# Patient Record
Sex: MALE | Race: BLACK OR AFRICAN AMERICAN | NOT HISPANIC OR LATINO | Employment: FULL TIME | URBAN - METROPOLITAN AREA
[De-identification: names, ages, dates, MRNs, and addresses within clinical notes are randomized per-mention and may not be internally consistent; named-entity substitution may affect disease eponyms.]

---

## 2018-08-30 ENCOUNTER — OFFICE VISIT (OUTPATIENT)
Dept: UROLOGY | Facility: CLINIC | Age: 33
End: 2018-08-30
Payer: COMMERCIAL

## 2018-08-30 VITALS
HEART RATE: 82 BPM | BODY MASS INDEX: 31.66 KG/M2 | WEIGHT: 260 LBS | SYSTOLIC BLOOD PRESSURE: 134 MMHG | HEIGHT: 76 IN | DIASTOLIC BLOOD PRESSURE: 86 MMHG

## 2018-08-30 DIAGNOSIS — E29.1 TESTICULAR FAILURE: Primary | ICD-10-CM

## 2018-08-30 PROCEDURE — 3008F BODY MASS INDEX DOCD: CPT | Mod: CPTII,S$GLB,, | Performed by: UROLOGY

## 2018-08-30 PROCEDURE — 99999 PR PBB SHADOW E&M-NEW PATIENT-LVL III: CPT | Mod: PBBFAC,,, | Performed by: UROLOGY

## 2018-08-30 PROCEDURE — 99204 OFFICE O/P NEW MOD 45 MIN: CPT | Mod: S$GLB,,, | Performed by: UROLOGY

## 2018-08-30 NOTE — PROGRESS NOTES
"Chief Complaint:  Infertility    HPI:    Mr. Livingston is a 32 y.o.  male who has been  to his wife for the past 4 years. They have been trying to achieve a pregnancy for the past 3 years but without success. 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 He denies a history of erectile dysfunction and ejaculatory problems.  He reports that he saw Dr. Langston who did a scrotal u/s showing a varicocele.    He has achieved 0 pregnancies in the past.    SA 16 ([facility])--5.0 cc/0.4 million per cc/25%/0%  SA 17 ([facility])--7.0 cc/4.9 million per cc/76%/0%    Mrs. Livingston is 31 years old. ( [birth year]) Her menses are irregular. She has undergone prior hysterosalpingogram. This was normal. She has achieved 0 prior pregnancies.  She sees Dr. Noonan    The couple has not undergone prior intrauterine insemination procedures.    The couple has undergone prior in-vitro fertilization procedures x 2.    Main Livingston III denies a history of exposure to harmful chemicals, toxins, and radiation.    No history of recent fevers greater than 101.5 degrees Farenheit.    No history of recent exposure to "wet heat."    No history of urological trauma or testicular torsion.    No history of prostatitis, epididymitis, and orchitis.    No history of post-pubertal mumps.    There is no known family history of fertility problems.    REVIEW OF SYSTEMS:     He denies headache, blurred vision, fever, nausea, vomiting, chills, flank discomfort, abdominal pain, bleeding per rectum, cough, SOB, recent loss of consciousness, recent mental status changes, seizures, dizziness, or upper/lower extremity weakness.    PHYSICAL EXAM:     The patient generally appears in good health, is appropriately interactive, and is in no apparent distress.     Eyes: anicteric sclerae, moist conjunctivae; no lid-lag; PERRLA     HENT: Atraumatic; " oropharynx clear with moist mucous membranes and no mucosal ulcerations;normal hard and soft palate.  No evidence of lymphadenopathy.    Neck: Trachea midline.  No thyromegaly.    Musculoskeletal: No abnormal gait.    Skin: No lesions.    Mental: Cooperative with normal affect.  Is oriented to time, place, and person.    Neuro: Grossly intact.    Chest: Normal inspiratory effort.   No accessory muscles.  No audible wheezes.  Respirations symmetric on inspiration and expiration.    Heart: Regular rhythm.      Abdomen:  Soft, non-tender. No masses or organomegaly. Bladder is not palpable. No evidence of flank discomfort. No evidence of inguinal hernia.    Genitourinary: Penis is normal with no evidence of plaques or induration. Urethral meatus is normal. Scrotum is normal. Testes are descended bilaterally with no evidence of abnormal masses or tenderness. Epididymis, vas deferens, and cord structures are normal bilaterally.  Testicular volume is approximately 18 cc bilaterally.    Extremities: No cyanosis, clubbing, or edema.    IMPRESSION & PLAN:    Mr. Livingston is a 32 y.o.  male who has been  to his wife for the past 4 years. They have been trying to achieve a pregnancy for the past 3 years but without success. 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 He denies a history of erectile dysfunction and ejaculatory problems.  He reports that he saw Dr. Langston who did a scrotal u/s showing a varicocele.    He has achieved 0 pregnancies in the past.    SA 6/1/16 ([facility])--5.0 cc/0.4 million per cc/25%/0%  SA 7/5/17 ([facility])--7.0 cc/4.9 million per cc/76%/0%    1.  FSH, LH, testosterone, prolactin, and estradiol serum levels today.  2.  Will also check a karyotype and Y chromosome microdeletion.  3.  Return to the clinic in 3 weeks to discuss test results and treatment plan.  4.  Recommend avoiding  ""wet heat."  5.  Recommend taking a multivitamin and 500 mg of vitamin c daily in addition to the multivitamin.  6.  Please send a copy of the note to Dr. Noonan.  Thank you for the consultation.  7.  Discussed varicocele's potential impact on fertility. However, discussed that I can't palpate his varicocele.  Discussed that it's not recommended to correct subclinical varicoceles.  8.  Will phone review the bloodwork.  If normal, would proceed with IVF again.    CC: Saran    "

## 2018-08-30 NOTE — LETTER
August 30, 2018      Missael Noonan MD  [address]  [address]  [facility]  [place] [address]           [facility]  [address]  [place] [address]  Phone: [phone]          Patient: Main Livingston III   MR Number: [id number]   YOB: 1985   Date of Visit: 8/30/2018       Dear Dr. Missael Noonan:    Thank you for referring Main Livingston to me for evaluation. Attached you will find relevant portions of my assessment and plan of care.    If you have questions, please do not hesitate to call me. I look forward to following Main Livingston along with you.    Sincerely,    Alberto Marroquin MD    Enclosure  CC:  No Recipients    If you would like to receive this communication electronically, please contact [email or web address] or [phone] to request more information on [employer] Mobile Link access.    For providers and/or their staff who would like to refer a patient to [facility], please contact us through our one-stop-shop provider referral line, Nik Berger, at [phone].    If you feel you have received this communication in error or would no longer like to receive these types of communications, please e-mail [email or web address]

## 2018-09-14 ENCOUNTER — TELEPHONE (OUTPATIENT)
Dept: UROLOGY | Facility: HOSPITAL | Age: 33
End: 2018-09-14